# Patient Record
Sex: MALE | Race: OTHER
[De-identification: names, ages, dates, MRNs, and addresses within clinical notes are randomized per-mention and may not be internally consistent; named-entity substitution may affect disease eponyms.]

---

## 2018-03-15 ENCOUNTER — HOSPITAL ENCOUNTER (INPATIENT)
Dept: HOSPITAL 72 - EMR | Age: 68
LOS: 6 days | Discharge: HOME | DRG: 203 | End: 2018-03-21
Payer: COMMERCIAL

## 2018-03-15 VITALS — BODY MASS INDEX: 31.5 KG/M2 | HEIGHT: 66 IN | WEIGHT: 196 LBS

## 2018-03-15 VITALS — DIASTOLIC BLOOD PRESSURE: 56 MMHG | SYSTOLIC BLOOD PRESSURE: 141 MMHG

## 2018-03-15 VITALS — DIASTOLIC BLOOD PRESSURE: 73 MMHG | SYSTOLIC BLOOD PRESSURE: 160 MMHG

## 2018-03-15 VITALS — DIASTOLIC BLOOD PRESSURE: 106 MMHG | SYSTOLIC BLOOD PRESSURE: 159 MMHG

## 2018-03-15 VITALS — DIASTOLIC BLOOD PRESSURE: 80 MMHG | SYSTOLIC BLOOD PRESSURE: 160 MMHG

## 2018-03-15 DIAGNOSIS — Z95.1: ICD-10-CM

## 2018-03-15 DIAGNOSIS — I13.10: ICD-10-CM

## 2018-03-15 DIAGNOSIS — J45.901: Primary | ICD-10-CM

## 2018-03-15 DIAGNOSIS — R00.1: ICD-10-CM

## 2018-03-15 DIAGNOSIS — I10: ICD-10-CM

## 2018-03-15 DIAGNOSIS — I25.2: ICD-10-CM

## 2018-03-15 DIAGNOSIS — I12.9: ICD-10-CM

## 2018-03-15 DIAGNOSIS — E11.22: ICD-10-CM

## 2018-03-15 DIAGNOSIS — I25.5: ICD-10-CM

## 2018-03-15 DIAGNOSIS — I25.10: ICD-10-CM

## 2018-03-15 DIAGNOSIS — E78.5: ICD-10-CM

## 2018-03-15 DIAGNOSIS — Z87.891: ICD-10-CM

## 2018-03-15 DIAGNOSIS — E11.65: ICD-10-CM

## 2018-03-15 DIAGNOSIS — N18.3: ICD-10-CM

## 2018-03-15 LAB
ADD MANUAL DIFF: YES
ALBUMIN SERPL-MCNC: 3.3 G/DL (ref 3.4–5)
ALBUMIN/GLOB SERPL: 0.8 {RATIO} (ref 1–2.7)
ALP SERPL-CCNC: 100 U/L (ref 46–116)
ALT SERPL-CCNC: 35 U/L (ref 12–78)
ANION GAP SERPL CALC-SCNC: 3 MMOL/L (ref 5–15)
APPEARANCE UR: CLEAR
APTT PPP: (no result) S
AST SERPL-CCNC: 24 U/L (ref 15–37)
BILIRUB SERPL-MCNC: 0.3 MG/DL (ref 0.2–1)
BUN SERPL-MCNC: 17 MG/DL (ref 7–18)
CALCIUM SERPL-MCNC: 9.2 MG/DL (ref 8.5–10.1)
CHLORIDE SERPL-SCNC: 101 MMOL/L (ref 98–107)
CO2 SERPL-SCNC: 36 MMOL/L (ref 21–32)
CREAT SERPL-MCNC: 1.3 MG/DL (ref 0.55–1.3)
ERYTHROCYTE [DISTWIDTH] IN BLOOD BY AUTOMATED COUNT: 11.8 % (ref 11.6–14.8)
GLOBULIN SER-MCNC: 4.1 G/DL
GLUCOSE UR STRIP-MCNC: (no result) MG/DL
HCT VFR BLD CALC: 40.3 % (ref 42–52)
HGB BLD-MCNC: 14.2 G/DL (ref 14.2–18)
KETONES UR QL STRIP: NEGATIVE
LEUKOCYTE ESTERASE UR QL STRIP: NEGATIVE
MCV RBC AUTO: 94 FL (ref 80–99)
NITRITE UR QL STRIP: NEGATIVE
PH UR STRIP: 7 [PH] (ref 4.5–8)
PLATELET # BLD: 279 K/UL (ref 150–450)
POTASSIUM SERPL-SCNC: 4.8 MMOL/L (ref 3.5–5.1)
PROT UR QL STRIP: NEGATIVE
RBC # BLD AUTO: 4.27 M/UL (ref 4.7–6.1)
SODIUM SERPL-SCNC: 140 MMOL/L (ref 136–145)
SP GR UR STRIP: 1 (ref 1–1.03)
UROBILINOGEN UR-MCNC: NORMAL MG/DL (ref 0–1)
WBC # BLD AUTO: 8.7 K/UL (ref 4.8–10.8)

## 2018-03-15 PROCEDURE — 84443 ASSAY THYROID STIM HORMONE: CPT

## 2018-03-15 PROCEDURE — 80048 BASIC METABOLIC PNL TOTAL CA: CPT

## 2018-03-15 PROCEDURE — 84484 ASSAY OF TROPONIN QUANT: CPT

## 2018-03-15 PROCEDURE — 94664 DEMO&/EVAL PT USE INHALER: CPT

## 2018-03-15 PROCEDURE — 99291 CRITICAL CARE FIRST HOUR: CPT

## 2018-03-15 PROCEDURE — 83880 ASSAY OF NATRIURETIC PEPTIDE: CPT

## 2018-03-15 PROCEDURE — 78452 HT MUSCLE IMAGE SPECT MULT: CPT

## 2018-03-15 PROCEDURE — 85007 BL SMEAR W/DIFF WBC COUNT: CPT

## 2018-03-15 PROCEDURE — 83036 HEMOGLOBIN GLYCOSYLATED A1C: CPT

## 2018-03-15 PROCEDURE — 80053 COMPREHEN METABOLIC PANEL: CPT

## 2018-03-15 PROCEDURE — 81003 URINALYSIS AUTO W/O SCOPE: CPT

## 2018-03-15 PROCEDURE — 93005 ELECTROCARDIOGRAM TRACING: CPT

## 2018-03-15 PROCEDURE — 71045 X-RAY EXAM CHEST 1 VIEW: CPT

## 2018-03-15 PROCEDURE — 85025 COMPLETE CBC W/AUTO DIFF WBC: CPT

## 2018-03-15 PROCEDURE — 36415 COLL VENOUS BLD VENIPUNCTURE: CPT

## 2018-03-15 PROCEDURE — 93306 TTE W/DOPPLER COMPLETE: CPT

## 2018-03-15 PROCEDURE — 36600 WITHDRAWAL OF ARTERIAL BLOOD: CPT

## 2018-03-15 PROCEDURE — 94640 AIRWAY INHALATION TREATMENT: CPT

## 2018-03-15 PROCEDURE — 84153 ASSAY OF PSA TOTAL: CPT

## 2018-03-15 PROCEDURE — 94760 N-INVAS EAR/PLS OXIMETRY 1: CPT

## 2018-03-15 PROCEDURE — 90630: CPT

## 2018-03-15 PROCEDURE — 80061 LIPID PANEL: CPT

## 2018-03-15 PROCEDURE — 82962 GLUCOSE BLOOD TEST: CPT

## 2018-03-15 PROCEDURE — 82803 BLOOD GASES ANY COMBINATION: CPT

## 2018-03-15 PROCEDURE — 93017 CV STRESS TEST TRACING ONLY: CPT

## 2018-03-15 RX ADMIN — Medication SCH MCG: at 15:36

## 2018-03-15 RX ADMIN — ALBUTEROL SULFATE SCH MG: 2.5 SOLUTION RESPIRATORY (INHALATION) at 15:20

## 2018-03-15 RX ADMIN — ALBUTEROL SULFATE SCH MG: 2.5 SOLUTION RESPIRATORY (INHALATION) at 16:19

## 2018-03-15 RX ADMIN — ALBUTEROL SULFATE SCH MG: 2.5 SOLUTION RESPIRATORY (INHALATION) at 16:06

## 2018-03-15 RX ADMIN — Medication SCH MCG: at 15:03

## 2018-03-15 RX ADMIN — ALBUTEROL SULFATE SCH MG: 2.5 SOLUTION RESPIRATORY (INHALATION) at 16:29

## 2018-03-15 RX ADMIN — ALBUTEROL SULFATE SCH MG: 2.5 SOLUTION RESPIRATORY (INHALATION) at 15:36

## 2018-03-15 RX ADMIN — ALBUTEROL SULFATE SCH MG: 2.5 SOLUTION RESPIRATORY (INHALATION) at 15:02

## 2018-03-15 RX ADMIN — Medication SCH MCG: at 15:20

## 2018-03-15 NOTE — EMERGENCY ROOM REPORT
History of Present Illness


General


Chief Complaint:  Upper Respiratory Illness


Source:  Patient





Present Illness


HPI


67-year-old male with history of asthma, htn p/w SOB for 2 weeks


SOB occurs both at rest and on exertion. + non productive cough. Denies chest 

pain.


Patient has been using albuterol inhaler every 6 hours. No recent steroid use.


Pt states that this episode is similar to other episodes of asthma exacerbation.


Denies fever, chills. Denies sick contacts or recent travel.


States that he just came from his doctor's office who sent him to the ER


Allergies:  


Coded Allergies:  


     No Known Allergies (Unverified , 3/15/18)





Patient History


Past Medical History:  see triage record


Past Surgical History:  none


Pertinent Family History:  none


Reviewed Nursing Documentation:  PMH: Agreed, PSxH: Agreed





Nursing Documentation-PMH


Past Medical History:  No History, Except For


Hx Hypertension:  Yes


Hx Asthma:  Yes


Hx Diabetes:  Yes





Review of Systems


All Other Systems:  negative except mentioned in HPI





Physical Exam





Vital Signs








  Date Time  Temp Pulse Resp B/P (MAP) Pulse Ox O2 Delivery O2 Flow Rate FiO2


 


3/15/18 14:38 98.2 68 18 160/84 92 Room Air  





 98.2       








Sp02 EP Interpretation:  reviewed, normal


General Appearance:  alert, GCS 15, non-toxic, moderate distress


Head:  normocephalic, atraumatic


Eyes:  bilateral eye normal inspection, bilateral eye PERRL, bilateral eye EOMI


ENT:  normal ENT inspection, normal pharynx, normal voice, moist mucus membranes


Neck:  normal inspection, full range of motion, supple


Respiratory:  respiratory distress, wheezing, expiration


Cardiovascular #1:  normal inspection, regular rate, rhythm, no edema, normal 

capillary refill


Cardiovascular #2:  2+ radial (R), 2+ radial (L)


Gastrointestinal:  normal inspection, non tender, soft, non-distended, no 

guarding


Genitourinary:  no CVA tenderness


Musculoskeletal:  normal inspection, back normal, normal range of motion, non-

tender


Neurologic:  normal inspection, alert, oriented x3, responsive, motor strength/

tone normal, sensory intact, normal gait, speech normal


Psychiatric:  normal inspection, judgement/insight normal, memory normal


Skin:  normal inspection, normal color, no rash, warm/dry, well hydrated, 

normal turgor





Procedures


Critical Care Time


Critical Care Time


40 minutes of CC time


67-year-old male with asthma exacerbation


VS: Hypoxic, tachypneic





PLAN: IV access, labs, meds steroids and magnesium





Anticipate admission to [facility] vs. [facility]


CC time also includes review of labs, review of EMR, discussion with family and 

paperwork from SNF, d/w hospitalist


CC could include dosing of pressors, additional Abx


CC time does not include procedures





Medical Decision Making


Diagnostic Impression:  


 Primary Impression:  


 Asthma exacerbation


ER Course





67-year-old male with history of asthma p/w SOB


DDX: 


Asthma exacerbation, pneumonia, upper respiratory infection/viral syndrome





Plan: 


Combivent nebulizer treatment x 3, steroids, EKG, CXR


If patient's condition minimally improves/worsens will require IV access and 

blood work. Possible IV medications such as magnesium sulfate, continuous 

albuterol, BIPAP. 





ER Course:


Patient's respiratory status has been closely monitored in the ED.


Patient has been treated with combivent x 3, steroids, antibiotics.


IV mag sulfate


CXR reveals no acute infiltrate


Repeat lung auscultation reveals persistent wheezing. more nebs given, however 

pt does appear more comfortable








Disposition:


pt approved to be admitted to observation


DW Dr Guillen





Please note that this Emergency Department Report was dictated using [employer] technology software, occasionally this can lead to 

erroneous entry secondary to interpretation by the dictation equipment.





EKG Diagnostic Results 


EP Interpretation: Yes


Rate: normal


Rhythm: NSR


ST Segments: No acute changes, RBBB


ASA given to patient: No





Rhythm Strip


EP Interpretation: Yes


Rate: 66


Rhythm: NSR, no PVCs, no ectopy








Chest X-ray


CXR: Ordered: Yes


1 view


Indication: SOB


EP interpretation: Yes


Interpretation: No consolidation, no effusion, no PTX, no acute cardiopulmonary 

disease


Impression: No acute disease





Electronically signed by Clement Fraire MD





Laboratory Tests








Test


  3/15/18


14:25 3/15/18


15:00


 


Urine Color Pale yellow   


 


Urine Appearance Clear   


 


Urine pH 7 (4.5-8.0)   


 


Urine Specific Gravity


  1.005


(1.005-1.035) 


 


 


Urine Protein


  Negative


(NEGATIVE) 


 


 


Urine Glucose (UA)


  4+ (NEGATIVE)


H 


 


 


Urine Ketones


  Negative


(NEGATIVE) 


 


 


Urine Occult Blood


  Negative


(NEGATIVE) 


 


 


Urine Nitrite


  Negative


(NEGATIVE) 


 


 


Urine Bilirubin


  Negative


(NEGATIVE) 


 


 


Urine Urobilinogen


  Normal MG/DL


(0.0-1.0) 


 


 


Urine Leukocyte Esterase


  Negative


(NEGATIVE) 


 


 


White Blood Count


  


  8.7 K/UL


(4.8-10.8)


 


Red Blood Count


  


  4.27 M/UL


(4.70-6.10)  L


 


Hemoglobin


  


  14.2 G/DL


(14.2-18.0)


 


Hematocrit


  


  40.3 %


(42.0-52.0)  L


 


Mean Corpuscular Volume  94 FL (80-99)  


 


Mean Corpuscular Hemoglobin


  


  33.2 PG


(27.0-31.0)  H


 


Mean Corpuscular Hemoglobin


Concent 


  35.1 G/DL


(32.0-36.0)


 


Red Cell Distribution Width


  


  11.8 %


(11.6-14.8)


 


Platelet Count


  


  279 K/UL


(150-450)


 


Mean Platelet Volume


  


  5.7 FL


(6.5-10.1)  L


 


Neutrophils (%) (Auto)


  


  % (45.0-75.0)


 


 


Lymphocytes (%) (Auto)


  


  % (20.0-45.0)


 


 


Monocytes (%) (Auto)   % (1.0-10.0)  


 


Eosinophils (%) (Auto)   % (0.0-3.0)  


 


Basophils (%) (Auto)   % (0.0-2.0)  


 


Neutrophils % (Manual)  Pending  


 


Lymphocytes % (Manual)  Pending  


 


Platelet Estimate  Pending  


 


Platelet Morphology  Pending  


 


Sodium Level


  


  140 MMOL/L


(136-145)


 


Potassium Level


  


  4.8 MMOL/L


(3.5-5.1)


 


Chloride Level


  


  101 MMOL/L


()


 


Carbon Dioxide Level


  


  36 MMOL/L


(21-32)  H


 


Anion Gap


  


  3 mmol/L


(5-15)  L


 


Blood Urea Nitrogen


  


  17 mg/dL


(7-18)


 


Creatinine


  


  1.3 MG/DL


(0.55-1.30)


 


Estimate Glomerular


Filtration Rate 


  55.2 mL/min


(>60)


 


Glucose Level


  


  222 MG/DL


()  H


 


Calcium Level


  


  9.2 MG/DL


(8.5-10.1)


 


Total Bilirubin


  


  0.3 MG/DL


(0.2-1.0)


 


Aspartate Amino Transferase


(AST) 


  24 U/L (15-37)


 


 


Alanine Aminotransferase (ALT)


  


  35 U/L (12-78)


 


 


Alkaline Phosphatase


  


  100 U/L


()


 


Troponin I


  


  0.004 ng/mL


(0.000-0.056)


 


Pro-B-Type Natriuretic Peptide


  


  253 pg/mL


(0-125)  H


 


Total Protein


  


  7.4 G/DL


(6.4-8.2)


 


Albumin


  


  3.3 G/DL


(3.4-5.0)  L


 


Globulin  4.1 g/dL  


 


Albumin/Globulin Ratio


  


  0.8 (1.0-2.7)


L











Last Vital Signs








  Date Time  Temp Pulse Resp B/P (MAP) Pulse Ox O2 Delivery O2 Flow Rate FiO2


 


3/15/18 14:50 98.5 66 20 159/106 97 Room Air  





 98.5       








Disposition:  PLACE IN OBSERVATION


Condition:  Serious











RetinoClement M.D. Mar 15, 2018 14:53

## 2018-03-15 NOTE — DIAGNOSTIC IMAGING REPORT
Indication: Shortness of breath

 

Technique: One view of the chest

 

Comparison: none

 

Findings: The heart is upper limits of normal in size. Lungs and pleural spaces are

clear.

 

Impression: No acute process

## 2018-03-16 VITALS — DIASTOLIC BLOOD PRESSURE: 68 MMHG | SYSTOLIC BLOOD PRESSURE: 140 MMHG

## 2018-03-16 VITALS — SYSTOLIC BLOOD PRESSURE: 151 MMHG | DIASTOLIC BLOOD PRESSURE: 79 MMHG

## 2018-03-16 VITALS — SYSTOLIC BLOOD PRESSURE: 147 MMHG | DIASTOLIC BLOOD PRESSURE: 60 MMHG

## 2018-03-16 VITALS — SYSTOLIC BLOOD PRESSURE: 155 MMHG | DIASTOLIC BLOOD PRESSURE: 93 MMHG

## 2018-03-16 VITALS — DIASTOLIC BLOOD PRESSURE: 110 MMHG | SYSTOLIC BLOOD PRESSURE: 163 MMHG

## 2018-03-16 VITALS — DIASTOLIC BLOOD PRESSURE: 68 MMHG | SYSTOLIC BLOOD PRESSURE: 146 MMHG

## 2018-03-16 LAB
ADD MANUAL DIFF: YES
ANION GAP SERPL CALC-SCNC: 12 MMOL/L (ref 5–15)
BUN SERPL-MCNC: 22 MG/DL (ref 7–18)
CALCIUM SERPL-MCNC: 9.4 MG/DL (ref 8.5–10.1)
CHLORIDE SERPL-SCNC: 95 MMOL/L (ref 98–107)
CO2 SERPL-SCNC: 27 MMOL/L (ref 21–32)
CREAT SERPL-MCNC: 1.6 MG/DL (ref 0.55–1.3)
ERYTHROCYTE [DISTWIDTH] IN BLOOD BY AUTOMATED COUNT: 11.9 % (ref 11.6–14.8)
HCT VFR BLD CALC: 39.6 % (ref 42–52)
HGB BLD-MCNC: 13.6 G/DL (ref 14.2–18)
MCV RBC AUTO: 96 FL (ref 80–99)
PLATELET # BLD: 289 K/UL (ref 150–450)
POTASSIUM SERPL-SCNC: 4.8 MMOL/L (ref 3.5–5.1)
RBC # BLD AUTO: 4.14 M/UL (ref 4.7–6.1)
SODIUM SERPL-SCNC: 134 MMOL/L (ref 136–145)
WBC # BLD AUTO: 10.8 K/UL (ref 4.8–10.8)

## 2018-03-16 RX ADMIN — METHYLPREDNISOLONE SODIUM SUCCINATE SCH MG: 40 INJECTION, POWDER, LYOPHILIZED, FOR SOLUTION INTRAMUSCULAR; INTRAVENOUS at 17:56

## 2018-03-16 RX ADMIN — METHYLPREDNISOLONE SODIUM SUCCINATE SCH MG: 40 INJECTION, POWDER, LYOPHILIZED, FOR SOLUTION INTRAMUSCULAR; INTRAVENOUS at 14:14

## 2018-03-16 RX ADMIN — IPRATROPIUM BROMIDE AND ALBUTEROL SULFATE SCH ML: .5; 3 SOLUTION RESPIRATORY (INHALATION) at 23:02

## 2018-03-16 RX ADMIN — INSULIN ASPART SCH UNITS: 100 INJECTION, SOLUTION INTRAVENOUS; SUBCUTANEOUS at 21:38

## 2018-03-16 RX ADMIN — IPRATROPIUM BROMIDE AND ALBUTEROL SULFATE SCH ML: .5; 3 SOLUTION RESPIRATORY (INHALATION) at 11:31

## 2018-03-16 RX ADMIN — LOSARTAN POTASSIUM SCH MG: 50 TABLET, FILM COATED ORAL at 10:50

## 2018-03-16 RX ADMIN — METOPROLOL TARTRATE SCH MG: 50 TABLET, FILM COATED ORAL at 21:43

## 2018-03-16 RX ADMIN — METHYLPREDNISOLONE SODIUM SUCCINATE SCH MG: 40 INJECTION, POWDER, LYOPHILIZED, FOR SOLUTION INTRAMUSCULAR; INTRAVENOUS at 01:00

## 2018-03-16 RX ADMIN — IPRATROPIUM BROMIDE AND ALBUTEROL SULFATE SCH ML: .5; 3 SOLUTION RESPIRATORY (INHALATION) at 07:44

## 2018-03-16 RX ADMIN — FUROSEMIDE SCH MG: 40 TABLET ORAL at 10:51

## 2018-03-16 RX ADMIN — IPRATROPIUM BROMIDE AND ALBUTEROL SULFATE SCH ML: .5; 3 SOLUTION RESPIRATORY (INHALATION) at 03:24

## 2018-03-16 RX ADMIN — INSULIN DETEMIR SCH UNITS: 100 INJECTION, SOLUTION SUBCUTANEOUS at 11:00

## 2018-03-16 RX ADMIN — IPRATROPIUM BROMIDE AND ALBUTEROL SULFATE SCH ML: .5; 3 SOLUTION RESPIRATORY (INHALATION) at 19:27

## 2018-03-16 RX ADMIN — IPRATROPIUM BROMIDE AND ALBUTEROL SULFATE SCH ML: .5; 3 SOLUTION RESPIRATORY (INHALATION) at 14:54

## 2018-03-16 RX ADMIN — METOPROLOL TARTRATE SCH MG: 50 TABLET, FILM COATED ORAL at 10:51

## 2018-03-16 RX ADMIN — METHYLPREDNISOLONE SODIUM SUCCINATE SCH MG: 40 INJECTION, POWDER, LYOPHILIZED, FOR SOLUTION INTRAMUSCULAR; INTRAVENOUS at 06:24

## 2018-03-16 NOTE — HISTORY AND PHYSICAL REPORT
DATE OF ADMISSION:  03/15/2018



HISTORY OF PRESENT ILLNESS:  The patient is a 67-year-old man, who came to

the emergency department because of increasing shortness of breath,

wheezing, and cough due to asthma.  He states he has been having more

difficulty over the past two weeks.  He has asthma for about four years.

He quit smoking about 30 years ago, but was a heavy smoker prior to that.

The patient was admitted and started on breathing treatments and steroids

and is feeling better.  He is coughing up yellow sputum.  He has no high

fever.



PAST MEDICAL HISTORY:  The patient has longstanding asthma as noted above.

In addition, he has diabetes, hypertension, and hyperlipidemia.  He states

he has had two heart attacks in the past.  He has a history of edema and

difficulty urinating.



ALLERGIES:  None.



MEDICATIONS:  Reviewed.



REVIEW OF SYSTEMS:  He complains of arthritis.



PHYSICAL EXAMINATION:

GENERAL:  The patient is overweight.

VITAL SIGNS:  Show blood pressure is elevated.  There is no fever.

SKIN:  Warm and dry.

HEAD:  Head is normocephalic.

NECK:  No jugular vein distention.

CHEST:  Has wheezing.

CARDIAC:  Rhythm is regular.

ABDOMEN:  Obese, soft, and nontender.

EXTREMITIES:  Have 1+ edema.



LABORATORY AND DIAGNOSTIC DATA:  Laboratory studies are reviewed.  Chest

x-ray is clear.



IMPRESSION:

1. Exacerbation of asthma.

2. Diabetes, poor control.

3. Hypertension, poor control.

4. History of coronary disease.

5. Chronic kidney disease, stage 3 associated with diabetes.



PLAN:  The patient will be continued on nebulized bronchodilators and

steroids.  We will add antibiotics and blood pressure medication.



We will not give nonsteroidal medications due to kidney disease.  The

blood sugar will be monitored and insulin adjusted accordingly.









  ______________________________________________

  Ward Valencia M.D.





DR:  JOANNA

D:  03/16/2018 09:45

T:  03/16/2018 15:01

JOB#:  [id number]

CC:  Teto Guillen M.D.; Fax#:  [phone]

WARD VALENCIA M.D.  ; FAX#:  [phone]

## 2018-03-17 VITALS — SYSTOLIC BLOOD PRESSURE: 141 MMHG | DIASTOLIC BLOOD PRESSURE: 65 MMHG

## 2018-03-17 VITALS — DIASTOLIC BLOOD PRESSURE: 95 MMHG | SYSTOLIC BLOOD PRESSURE: 135 MMHG

## 2018-03-17 VITALS — SYSTOLIC BLOOD PRESSURE: 137 MMHG | DIASTOLIC BLOOD PRESSURE: 65 MMHG

## 2018-03-17 VITALS — DIASTOLIC BLOOD PRESSURE: 61 MMHG | SYSTOLIC BLOOD PRESSURE: 130 MMHG

## 2018-03-17 VITALS — DIASTOLIC BLOOD PRESSURE: 60 MMHG | SYSTOLIC BLOOD PRESSURE: 137 MMHG

## 2018-03-17 VITALS — SYSTOLIC BLOOD PRESSURE: 159 MMHG | DIASTOLIC BLOOD PRESSURE: 69 MMHG

## 2018-03-17 LAB
ADD MANUAL DIFF: YES
ALBUMIN SERPL-MCNC: 3.7 G/DL (ref 3.4–5)
ALBUMIN/GLOB SERPL: 0.9 {RATIO} (ref 1–2.7)
ALP SERPL-CCNC: 99 U/L (ref 46–116)
ALT SERPL-CCNC: 64 U/L (ref 12–78)
ANION GAP SERPL CALC-SCNC: 11 MMOL/L (ref 5–15)
AST SERPL-CCNC: 95 U/L (ref 15–37)
BILIRUB SERPL-MCNC: 0.3 MG/DL (ref 0.2–1)
BUN SERPL-MCNC: 38 MG/DL (ref 7–18)
CALCIUM SERPL-MCNC: 9 MG/DL (ref 8.5–10.1)
CHLORIDE SERPL-SCNC: 98 MMOL/L (ref 98–107)
CHOLEST SERPL-MCNC: 156 MG/DL (ref ?–200)
CO2 SERPL-SCNC: 31 MMOL/L (ref 21–32)
CREAT SERPL-MCNC: 1.7 MG/DL (ref 0.55–1.3)
ERYTHROCYTE [DISTWIDTH] IN BLOOD BY AUTOMATED COUNT: 11.9 % (ref 11.6–14.8)
GLOBULIN SER-MCNC: 4.2 G/DL
HCT VFR BLD CALC: 40.1 % (ref 42–52)
HDLC SERPL-MCNC: 41 MG/DL (ref 40–60)
HGB BLD-MCNC: 14 G/DL (ref 14.2–18)
MCV RBC AUTO: 94 FL (ref 80–99)
PLATELET # BLD: 292 K/UL (ref 150–450)
POTASSIUM SERPL-SCNC: 3.8 MMOL/L (ref 3.5–5.1)
RBC # BLD AUTO: 4.26 M/UL (ref 4.7–6.1)
SODIUM SERPL-SCNC: 139 MMOL/L (ref 136–145)
TRIGL SERPL-MCNC: 118 MG/DL (ref 30–150)
WBC # BLD AUTO: 18.3 K/UL (ref 4.8–10.8)

## 2018-03-17 RX ADMIN — IPRATROPIUM BROMIDE AND ALBUTEROL SULFATE SCH ML: .5; 3 SOLUTION RESPIRATORY (INHALATION) at 23:00

## 2018-03-17 RX ADMIN — METHYLPREDNISOLONE SODIUM SUCCINATE SCH MG: 40 INJECTION, POWDER, LYOPHILIZED, FOR SOLUTION INTRAMUSCULAR; INTRAVENOUS at 00:16

## 2018-03-17 RX ADMIN — INSULIN ASPART SCH UNITS: 100 INJECTION, SOLUTION INTRAVENOUS; SUBCUTANEOUS at 06:01

## 2018-03-17 RX ADMIN — LOSARTAN POTASSIUM SCH MG: 50 TABLET, FILM COATED ORAL at 08:31

## 2018-03-17 RX ADMIN — IPRATROPIUM BROMIDE AND ALBUTEROL SULFATE SCH ML: .5; 3 SOLUTION RESPIRATORY (INHALATION) at 07:01

## 2018-03-17 RX ADMIN — METOPROLOL TARTRATE SCH MG: 50 TABLET, FILM COATED ORAL at 08:31

## 2018-03-17 RX ADMIN — INSULIN DETEMIR SCH UNITS: 100 INJECTION, SOLUTION SUBCUTANEOUS at 08:43

## 2018-03-17 RX ADMIN — METOPROLOL TARTRATE SCH MG: 50 TABLET, FILM COATED ORAL at 20:49

## 2018-03-17 RX ADMIN — FUROSEMIDE SCH MG: 40 TABLET ORAL at 08:31

## 2018-03-17 RX ADMIN — IPRATROPIUM BROMIDE AND ALBUTEROL SULFATE SCH ML: .5; 3 SOLUTION RESPIRATORY (INHALATION) at 03:15

## 2018-03-17 RX ADMIN — IPRATROPIUM BROMIDE AND ALBUTEROL SULFATE SCH ML: .5; 3 SOLUTION RESPIRATORY (INHALATION) at 14:40

## 2018-03-17 RX ADMIN — INSULIN ASPART SCH UNITS: 100 INJECTION, SOLUTION INTRAVENOUS; SUBCUTANEOUS at 17:19

## 2018-03-17 RX ADMIN — IPRATROPIUM BROMIDE AND ALBUTEROL SULFATE SCH ML: .5; 3 SOLUTION RESPIRATORY (INHALATION) at 10:36

## 2018-03-17 RX ADMIN — INSULIN ASPART SCH UNITS: 100 INJECTION, SOLUTION INTRAVENOUS; SUBCUTANEOUS at 11:53

## 2018-03-17 RX ADMIN — METHYLPREDNISOLONE SODIUM SUCCINATE SCH MG: 40 INJECTION, POWDER, LYOPHILIZED, FOR SOLUTION INTRAMUSCULAR; INTRAVENOUS at 05:58

## 2018-03-17 RX ADMIN — IPRATROPIUM BROMIDE AND ALBUTEROL SULFATE SCH ML: .5; 3 SOLUTION RESPIRATORY (INHALATION) at 19:51

## 2018-03-17 RX ADMIN — INSULIN ASPART SCH UNITS: 100 INJECTION, SOLUTION INTRAVENOUS; SUBCUTANEOUS at 20:38

## 2018-03-17 NOTE — PULMONOLOGY PROGRESS NOTE
Assessment/Plan


Assessment/Plan


1. Exacerbation of asthma.


2. Diabetes, poor control.


3. Hypertension, poor control.


4. History of coronary disease.


5. Chronic kidney disease, stage 3 associated with diabetes.





PLAN:  The patient will be continued on nebulized bronchodilators and


steroids.  Continue antibiotics and blood pressure medication.


The


blood sugar will be monitored and insulin adjusted accordingly.





Subjective


Interval Events:  Feeling better; WBC 18K


Constitutional:  Reports: no symptoms


HEENT:  Repors: no symptoms


Respiratory:  Reports: no symptoms


Cardiovascular:  Reports: no symptoms


Genitourinary:  Reports: no symptoms


Allergies:  


Coded Allergies:  


     No Known Allergies (Unverified , 3/15/18)





Objective





Last 24 Hour Vital Signs








  Date Time  Temp Pulse Resp B/P (MAP) Pulse Ox O2 Delivery O2 Flow Rate FiO2


 


3/17/18 10:43  83 20  100 Room Air  21


 


3/17/18 10:37        21


 


3/17/18 10:36  81 20  98 Room Air  21


 


3/17/18 08:31    141/65    


 


3/17/18 08:31  85  141/65    


 


3/17/18 08:00 97.9 85 21 141/65 97 Room Air  





 97.9       


 


3/17/18 07:14  82 20  99 Room Air  21


 


3/17/18 07:03        21


 


3/17/18 07:01  84 20  98 Room Air  21


 


3/17/18 04:00  83      


 


3/17/18 04:00 97.9 83 18 135/95 93 Room Air  





 97.9       


 


3/17/18 03:30  74 20  95 Room Air  21


 


3/17/18 03:15        21


 


3/17/18 03:14  74 20  95 Room Air  21


 


3/17/18 00:00  78      


 


3/17/18 00:00 97.9 78 20 130/61 95 Room Air  





 97.9       


 


3/16/18 23:14  88 22  95 Room Air  21


 


3/16/18 23:01  88 22  95 Room Air  21


 


3/16/18 23:01        21


 


3/16/18 21:43  91  146/68    


 


3/16/18 20:00  107      


 


3/16/18 20:00 98.4 91 16 146/68 95 Room Air  





 98.4       


 


3/16/18 19:39  98 22  94 Room Air  21


 


3/16/18 19:27  98 22  94 Room Air  21


 


3/16/18 19:27        21


 


3/16/18 16:00  98      


 


3/16/18 16:00 98.0 99 22 151/79 94 Room Air  





 98.0       


 


3/16/18 15:01  99 18  99 Room Air  21


 


3/16/18 14:55        21


 


3/16/18 14:54  94 18  98 Room Air  21


 


3/16/18 12:00  97      


 


3/16/18 12:00 97.2 109 20 155/93 94 Room Air  





 97.2       


 


3/16/18 11:37  105 18  100 Room Air  21


 


3/16/18 11:32        21


 


3/16/18 11:31  103 18  99 Room Air  21

















Intake and Output  


 


 3/16/18 3/17/18





 19:00 07:00


 


Intake Total 960 ml 1000 ml


 


Balance 960 ml 1000 ml


 


  


 


Intake Oral 960 ml 1000 ml








HEENT:  normocephalic


Respiratory/Chest:  chest wall non-tender, normal breath sounds


Cardiovascular:  normal peripheral pulses


Abdomen:  normal bowel sounds


Laboratory Tests


3/17/18 06:10: 


White Blood Count 18.3#H, Red Blood Count 4.26L, Hemoglobin 14.0L, Hematocrit 

40.1L, Mean Corpuscular Volume 94, Mean Corpuscular Hemoglobin 32.8H, Mean 

Corpuscular Hemoglobin Concent 34.8, Red Cell Distribution Width 11.9, Platelet 

Count 292, Mean Platelet Volume 5.7L, Neutrophils (%) (Auto) , Lymphocytes (%) (

Auto) , Monocytes (%) (Auto) , Eosinophils (%) (Auto) , Basophils (%) (Auto) , 

Differential Total Cells Counted 100, Neutrophils % (Manual) 94H, Lymphocytes % 

(Manual) 2L, Monocytes % (Manual) 4, Eosinophils % (Manual) 0, Basophils % (

Manual) 0, Band Neutrophils 0, Platelet Estimate Adequate, Platelet Morphology 

Normal, Red Blood Cell Morphology Normal, Sodium Level 139, Potassium Level 3.8

, Chloride Level 98, Carbon Dioxide Level 31, Anion Gap 11, Blood Urea Nitrogen 

38H, Creatinine 1.7H, Estimat Glomerular Filtration Rate 40.5, Glucose Level 

339H, Hemoglobin A1c 9.0H, Calcium Level 9.0, Total Bilirubin 0.3, Aspartate 

Amino Transf (AST/SGOT) 95H, Alanine Aminotransferase (ALT/SGPT) 64, Alkaline 

Phosphatase 99, Total Protein 7.9, Albumin 3.7, Globulin 4.2, Albumin/Globulin 

Ratio 0.9L, Triglycerides Level 118, Cholesterol Level 156, LDL Cholesterol 104H

, HDL Cholesterol 41, Cholesterol/HDL Ratio 3.8, Prostate Specific Antigen 1.46

, Thyroid Stimulating Hormone (TSH) 0.131L





Current Medications








 Medications


  (Trade)  Dose


 Ordered  Sig/Teresa


 Route


 PRN Reason  Start Time


 Stop Time Status Last Admin


Dose Admin


 


 Albuterol/


 Ipratropium


  (Albuterol/


 Ipratropium)  3 ml  Q4HRT


 HHN


   3/16/18 03:00


 3/21/18 02:59  3/17/18 10:36


 


 


 Azithromycin


  (Zithromax)  250 mg  DAILY


 ORAL


   3/17/18 09:00


 3/24/18 08:59  3/17/18 08:31


 


 


 Dextrose


  (Dextrose 50%)    STAT  PRN


 IV


 Hypoglycemia  3/16/18 20:45


 4/15/18 20:44   


 


 


 Furosemide


  (Lasix)  40 mg  DAILY


 ORAL


   3/16/18 09:00


 4/15/18 08:59  3/17/18 08:31


 


 


 Gabapentin


  (Neurontin)  300 mg  TID


 ORAL


   3/16/18 09:00


 4/15/18 08:59  3/17/18 08:31


 


 


 Insulin Aspart


  (NovoLOG)    BEFORE MEALS AND  HS


 SUBQ


   3/16/18 21:00


 4/15/18 20:59  3/17/18 06:01


 


 


 Insulin Detemir


  (Levemir)  8 units  DAILY


 SUBQ


   3/16/18 09:00


 4/15/18 08:59  3/17/18 08:43


 


 


 Insulin Detemir


  (Levemir)  60 units  BEDTIME


 SUBQ


   3/17/18 21:00


 4/16/18 20:59   


 


 


 Losartan Potassium


  (Cozaar)  100 mg  DAILY


 ORAL


   3/16/18 10:00


 4/15/18 09:59  3/17/18 08:31


 


 


 Methylprednisolone


 Sodium Succinate


  (Solu-MEDROL)  40 mg  EVERY 6  HOURS


 IVP


   3/16/18 00:00


 4/15/18 00:00  3/17/18 05:58


 


 


 Metoprolol


 Tartrate


  (Lopressor)  50 mg  Q12HR


 ORAL


   3/16/18 09:00


 4/15/18 08:59  3/17/18 08:31


 


 


 Pantoprazole


  (Protonix)  40 mg  DAILY


 ORAL


   3/16/18 09:45


 4/15/18 09:44  3/17/18 08:31


 


 


 Potassium Chloride


  (K-Dur)  20 meq  DAILY


 ORAL


   3/16/18 09:00


 4/15/18 08:59  3/17/18 08:31


 

















Teto Guillen MD Mar 17, 2018 11:21

## 2018-03-18 VITALS — SYSTOLIC BLOOD PRESSURE: 135 MMHG | DIASTOLIC BLOOD PRESSURE: 60 MMHG

## 2018-03-18 VITALS — SYSTOLIC BLOOD PRESSURE: 126 MMHG | DIASTOLIC BLOOD PRESSURE: 63 MMHG

## 2018-03-18 VITALS — SYSTOLIC BLOOD PRESSURE: 179 MMHG | DIASTOLIC BLOOD PRESSURE: 146 MMHG

## 2018-03-18 VITALS — DIASTOLIC BLOOD PRESSURE: 71 MMHG | SYSTOLIC BLOOD PRESSURE: 148 MMHG

## 2018-03-18 VITALS — DIASTOLIC BLOOD PRESSURE: 58 MMHG | SYSTOLIC BLOOD PRESSURE: 136 MMHG

## 2018-03-18 VITALS — SYSTOLIC BLOOD PRESSURE: 148 MMHG | DIASTOLIC BLOOD PRESSURE: 129 MMHG

## 2018-03-18 VITALS — DIASTOLIC BLOOD PRESSURE: 66 MMHG | SYSTOLIC BLOOD PRESSURE: 134 MMHG

## 2018-03-18 VITALS — SYSTOLIC BLOOD PRESSURE: 164 MMHG | DIASTOLIC BLOOD PRESSURE: 91 MMHG

## 2018-03-18 VITALS — SYSTOLIC BLOOD PRESSURE: 142 MMHG | DIASTOLIC BLOOD PRESSURE: 63 MMHG

## 2018-03-18 VITALS — SYSTOLIC BLOOD PRESSURE: 150 MMHG | DIASTOLIC BLOOD PRESSURE: 73 MMHG

## 2018-03-18 VITALS — DIASTOLIC BLOOD PRESSURE: 68 MMHG | SYSTOLIC BLOOD PRESSURE: 133 MMHG

## 2018-03-18 VITALS — SYSTOLIC BLOOD PRESSURE: 160 MMHG | DIASTOLIC BLOOD PRESSURE: 71 MMHG

## 2018-03-18 LAB
ADD MANUAL DIFF: YES
ANION GAP SERPL CALC-SCNC: 4 MMOL/L (ref 5–15)
BUN SERPL-MCNC: 33 MG/DL (ref 7–18)
CALCIUM SERPL-MCNC: 8.4 MG/DL (ref 8.5–10.1)
CHLORIDE SERPL-SCNC: 102 MMOL/L (ref 98–107)
CO2 SERPL-SCNC: 34 MMOL/L (ref 21–32)
CREAT SERPL-MCNC: 1.2 MG/DL (ref 0.55–1.3)
ERYTHROCYTE [DISTWIDTH] IN BLOOD BY AUTOMATED COUNT: 11.7 % (ref 11.6–14.8)
HCT VFR BLD CALC: 39.1 % (ref 42–52)
HGB BLD-MCNC: 13.6 G/DL (ref 14.2–18)
MCV RBC AUTO: 94 FL (ref 80–99)
PLATELET # BLD: 270 K/UL (ref 150–450)
POTASSIUM SERPL-SCNC: 4.4 MMOL/L (ref 3.5–5.1)
RBC # BLD AUTO: 4.16 M/UL (ref 4.7–6.1)
SODIUM SERPL-SCNC: 140 MMOL/L (ref 136–145)
WBC # BLD AUTO: 17.1 K/UL (ref 4.8–10.8)

## 2018-03-18 RX ADMIN — INSULIN DETEMIR SCH UNITS: 100 INJECTION, SOLUTION SUBCUTANEOUS at 20:58

## 2018-03-18 RX ADMIN — METOPROLOL TARTRATE SCH MG: 50 TABLET, FILM COATED ORAL at 20:55

## 2018-03-18 RX ADMIN — INSULIN ASPART SCH UNITS: 100 INJECTION, SOLUTION INTRAVENOUS; SUBCUTANEOUS at 16:31

## 2018-03-18 RX ADMIN — INSULIN ASPART SCH UNITS: 100 INJECTION, SOLUTION INTRAVENOUS; SUBCUTANEOUS at 20:58

## 2018-03-18 RX ADMIN — ASPIRIN 81 MG SCH MG: 81 TABLET ORAL at 21:27

## 2018-03-18 RX ADMIN — INSULIN ASPART SCH UNITS: 100 INJECTION, SOLUTION INTRAVENOUS; SUBCUTANEOUS at 06:21

## 2018-03-18 RX ADMIN — METHYLPREDNISOLONE SODIUM SUCCINATE SCH MG: 40 INJECTION, POWDER, LYOPHILIZED, FOR SOLUTION INTRAMUSCULAR; INTRAVENOUS at 20:54

## 2018-03-18 RX ADMIN — IPRATROPIUM BROMIDE AND ALBUTEROL SULFATE SCH ML: .5; 3 SOLUTION RESPIRATORY (INHALATION) at 03:00

## 2018-03-18 NOTE — PULMONOLOGY PROGRESS NOTE
Assessment/Plan


Assessment/Plan


1. Exacerbation of asthma.


2. Diabetes, poor control.


3. Hypertension, poor control.


4. History of coronary disease.


5. Chronic kidney disease, stage 3 associated with diabetes.


6. tachycardia; now resolved.





PLAN:  The patient will be continued on nebulized bronchodilators and


steroids.  Continue antibiotics and blood pressure medication.


The


blood sugar will be monitored and insulin adjusted accordingly.


Transfer back to [facility] kisha lockhart


Will consult cardiology





Subjective


Interval Events:  Transferred to ICU last night diue to tachycardia


Constitutional:  Reports: no symptoms


HEENT:  Repors: no symptoms


Respiratory:  Reports: no symptoms


Cardiovascular:  Reports: no symptoms


Gastrointestinal/Abdominal:  Reports: no symptoms


Allergies:  


Coded Allergies:  


     No Known Allergies (Unverified , 3/15/18)





Objective





Last 24 Hour Vital Signs








  Date Time  Temp Pulse Resp B/P (MAP) Pulse Ox O2 Delivery O2 Flow Rate FiO2


 


3/18/18 11:00  59 18 160/71 99 Nasal Cannula 2.0 


 


3/18/18 10:00  67 18 148/129 99 Nasal Cannula 2.0 


 


3/18/18 09:05  85  142/63    


 


3/18/18 09:03    142/63    


 


3/18/18 09:00  86 18 179/146 99 Nasal Cannula 2.0 


 


3/18/18 08:00 98.2 83 18 142/63 99 Nasal Cannula 2.0 





 98.2       


 


3/18/18 07:05  66 18 136/58 95 Nasal Cannula 2.0 


 


3/18/18 06:09  70 18 134/66 97 Nasal Cannula 2.0 


 


3/18/18 05:00  85 20 133/68 98 Nasal Cannula 2.0 


 


3/18/18 04:42      Nasal Cannula 2.0 


 


3/18/18 04:42     100 Nasal Cannula 2.0 


 


3/18/18 04:15  80      


 


3/18/18 04:00 98.0 180 20 164/91 97 Nasal Cannula 2.0 





 98.0       


 


3/18/18 03:30      Room Air  


 


3/18/18 03:29      Room Air  


 


3/18/18 00:03 97.4 67 18 126/63 94 Room Air  





 97.4       


 


3/18/18 00:00  65      


 


3/17/18 23:37      Room Air  


 


3/17/18 23:37      Room Air  


 


3/17/18 20:49  85  159/69    


 


3/17/18 20:00 97.1 85 17 159/69 94 Room Air  





 97.1       


 


3/17/18 20:00  83      


 


3/17/18 20:00  82 20  100 Room Air  21


 


3/17/18 19:52  84 20  96 Room Air  21


 


3/17/18 19:52        21


 


3/17/18 16:00  81      


 


3/17/18 16:00 97.7 81 22 137/65 95 Room Air  





 97.7       


 


3/17/18 14:48  86 20  100 Room Air  21


 


3/17/18 14:41        21


 


3/17/18 14:40  79 20  99 Room Air  21


 


3/17/18 12:00  82      


 


3/17/18 12:00 97.9 78 22 137/60 97 Room Air  





 97.9       

















Intake and Output  


 


 3/17/18 3/18/18





 19:00 07:00


 


Intake Total 900 ml 240 ml


 


Output Total  700 ml


 


Balance 900 ml -460 ml


 


  


 


Intake Oral 900 ml 240 ml


 


Output Urine Total  700 ml


 


# Bowel Movements  1








General Appearance:  no acute distress


HEENT:  normocephalic


Respiratory/Chest:  chest wall non-tender, lungs clear


Cardiovascular:  normal peripheral pulses, normal rate


Abdomen:  normal bowel sounds


Laboratory Tests


3/18/18 04:22: 


Arterial Blood pH 7.454H, Arterial Blood Partial Pressure CO2 42.9, Arterial 

Blood Partial Pressure O2 96.7, Arterial Blood HCO3 29.4H, Arterial Blood 

Oxygen Saturation 97.4, Arterial Blood Base Excess 4.9, Carlos Test Positive


3/18/18 07:00: 


White Blood Count 17.1H, Red Blood Count 4.16L, Hemoglobin 13.6L, Hematocrit 

39.1L, Mean Corpuscular Volume 94, Mean Corpuscular Hemoglobin 32.8H, Mean 

Corpuscular Hemoglobin Concent 34.8, Red Cell Distribution Width 11.7, Platelet 

Count 270, Mean Platelet Volume 6.1L, Neutrophils (%) (Auto) , Lymphocytes (%) (

Auto) , Monocytes (%) (Auto) , Eosinophils (%) (Auto) , Basophils (%) (Auto) , 

Differential Total Cells Counted 100, Neutrophils % (Manual) 88H, Lymphocytes % 

(Manual) 7L, Monocytes % (Manual) 5, Eosinophils % (Manual) 0, Basophils % (

Manual) 0, Band Neutrophils 0, Platelet Estimate Adequate, Platelet Morphology 

Normal, Red Blood Cell Morphology Normal, Sodium Level 140, Potassium Level 4.4

, Chloride Level 102, Carbon Dioxide Level 34H, Anion Gap 4L, Blood Urea 

Nitrogen 33H, Creatinine 1.2, Estimat Glomerular Filtration Rate > 60, Glucose 

Level 266H, Calcium Level 8.4L, Troponin I 0.121H





Current Medications








 Medications


  (Trade)  Dose


 Ordered  Sig/Teresa


 Route


 PRN Reason  Start Time


 Stop Time Status Last Admin


Dose Admin


 


 Azithromycin


  (Zithromax)  250 mg  DAILY


 ORAL


   3/18/18 09:00


 3/24/18 08:59  3/18/18 09:23


 


 


 Dextrose


  (Dextrose 50%)    STAT  PRN


 IV


 Hypoglycemia  3/18/18 07:00


 4/17/18 06:59   


 


 


 Furosemide


  (Lasix)  40 mg  DAILY


 ORAL


   3/18/18 09:00


 4/15/18 08:59  3/18/18 09:04


 


 


 Gabapentin


  (Neurontin)  300 mg  TID


 ORAL


   3/18/18 09:00


 4/15/18 08:59  3/18/18 08:58


 


 


 Insulin Aspart


  (NovoLOG)    BEFORE MEALS AND  HS


 SUBQ


   3/18/18 11:30


 4/15/18 20:59   


 


 


 Insulin Detemir


  (Levemir)  8 units  DAILY


 SUBQ


   3/18/18 09:00


 4/15/18 08:59  3/18/18 08:48


 


 


 Insulin Detemir


  (Levemir)  60 units  BEDTIME


 SUBQ


   3/18/18 21:00


 4/16/18 20:59   


 


 


 Losartan Potassium


  (Cozaar)  100 mg  DAILY


 ORAL


   3/18/18 09:00


 4/15/18 09:59  3/18/18 09:03


 


 


 Methylprednisolone


 Sodium Succinate


  (Solu-MEDROL)  40 mg  EVERY 12  HOURS


 IVP


   3/18/18 09:00


 4/15/18 00:00  3/18/18 08:49


 


 


 Metoprolol


 Tartrate


  (Lopressor)  50 mg  Q12HR


 ORAL


   3/18/18 09:00


 4/15/18 08:59  3/18/18 09:05


 


 


 Pantoprazole


  (Protonix)  40 mg  DAILY


 ORAL


   3/18/18 09:00


 4/15/18 09:44  3/18/18 08:58


 


 


 Potassium Chloride


  (K-Dur)  20 meq  DAILY


 ORAL


   3/18/18 09:00


 4/15/18 08:59  3/18/18 08:57


 

















Teto Guillen MD Mar 18, 2018 11:40

## 2018-03-19 VITALS — DIASTOLIC BLOOD PRESSURE: 85 MMHG | SYSTOLIC BLOOD PRESSURE: 143 MMHG

## 2018-03-19 VITALS — SYSTOLIC BLOOD PRESSURE: 150 MMHG | DIASTOLIC BLOOD PRESSURE: 75 MMHG

## 2018-03-19 VITALS — SYSTOLIC BLOOD PRESSURE: 139 MMHG | DIASTOLIC BLOOD PRESSURE: 58 MMHG

## 2018-03-19 VITALS — DIASTOLIC BLOOD PRESSURE: 60 MMHG | SYSTOLIC BLOOD PRESSURE: 142 MMHG

## 2018-03-19 VITALS — SYSTOLIC BLOOD PRESSURE: 144 MMHG | DIASTOLIC BLOOD PRESSURE: 72 MMHG

## 2018-03-19 RX ADMIN — INSULIN ASPART SCH UNITS: 100 INJECTION, SOLUTION INTRAVENOUS; SUBCUTANEOUS at 20:41

## 2018-03-19 RX ADMIN — METHYLPREDNISOLONE SODIUM SUCCINATE SCH MG: 40 INJECTION, POWDER, LYOPHILIZED, FOR SOLUTION INTRAMUSCULAR; INTRAVENOUS at 20:36

## 2018-03-19 RX ADMIN — INSULIN DETEMIR SCH UNITS: 100 INJECTION, SOLUTION SUBCUTANEOUS at 20:38

## 2018-03-19 RX ADMIN — AZITHROMYCIN DIHYDRATE SCH MG: 250 TABLET, FILM COATED ORAL at 09:43

## 2018-03-19 RX ADMIN — LOSARTAN POTASSIUM SCH MG: 50 TABLET, FILM COATED ORAL at 09:42

## 2018-03-19 RX ADMIN — INSULIN ASPART SCH UNITS: 100 INJECTION, SOLUTION INTRAVENOUS; SUBCUTANEOUS at 11:47

## 2018-03-19 RX ADMIN — METHYLPREDNISOLONE SODIUM SUCCINATE SCH MG: 40 INJECTION, POWDER, LYOPHILIZED, FOR SOLUTION INTRAMUSCULAR; INTRAVENOUS at 09:49

## 2018-03-19 RX ADMIN — FUROSEMIDE SCH MG: 40 TABLET ORAL at 09:43

## 2018-03-19 RX ADMIN — METOPROLOL TARTRATE SCH MG: 50 TABLET, FILM COATED ORAL at 09:43

## 2018-03-19 RX ADMIN — INSULIN ASPART SCH UNITS: 100 INJECTION, SOLUTION INTRAVENOUS; SUBCUTANEOUS at 16:19

## 2018-03-19 RX ADMIN — METOPROLOL TARTRATE SCH MG: 50 TABLET, FILM COATED ORAL at 20:37

## 2018-03-19 RX ADMIN — INSULIN ASPART SCH UNITS: 100 INJECTION, SOLUTION INTRAVENOUS; SUBCUTANEOUS at 05:48

## 2018-03-19 RX ADMIN — INSULIN DETEMIR SCH UNITS: 100 INJECTION, SOLUTION SUBCUTANEOUS at 09:50

## 2018-03-19 RX ADMIN — ASPIRIN 81 MG SCH MG: 81 TABLET ORAL at 09:42

## 2018-03-19 NOTE — CONSULTATION
DATE OF CONSULTATION:  03/18/2018



CARDIOLOGY CONSULTATION



CONSULTING PHYSICIAN:  Jayson Parker M.D.



REQUESTING PHYSICIAN:  Teto Guillen M.D.



HISTORY OF PRESENT ILLNESS:  This is a 67-year-old male, who came to the

emergency room several days ago with shortness of breath, wheezing, and

cough.  He has a history of asthma.  He was treated with inhaled

bronchodilators and antimicrobials.  He was admitted to the intensive care

unit due to worsening respiratory parameters over the past few days as

well as uncontrolled blood pressure.  His respiratory parameters improved

today and he was noted to have a troponin level elevation of 0.112, which

was normal on admission.  He has not noted any chest pain.



PAST MEDICAL HISTORY:  Coronary artery disease, history of myocardial

infarctions x2, type 2 diabetes mellitus, hypertension, hyperlipidemia,

asthma, and prostatic hypertrophy.



MEDICATIONS:  Reviewed.



ALLERGIES:  None.



FAMILY HISTORY:  Noncontributory.



SOCIAL HISTORY:  No record of smoking or substance abuse.  No alcohol

abuse.



REVIEW OF SYSTEMS:  Otherwise unremarkable.



PHYSICAL EXAMINATION:

VITAL SIGNS:  Blood pressure ranging from 142/63 to 160/71.  There is an

extraneous level of 179/146 noted early this morning as well.  He has

heart rate in the 59 to 86 and respiratory 18 to 20.

HEENT:  Conjunctiva pink.  Oropharynx clear.

NECK:  Supple.  No accessory muscle use.

LUNGS:  With diminished breath sounds.  No wheezing.

CARDIAC:  Regular rhythm and rate.  Normal S1, S2 with a fourth heart

sound.

ABDOMEN:  Soft.

EXTREMITIES:  No edema.



DIAGNOSTIC DATA:  EKG revealed ectopic atrial rhythm, right bundle-branch

block, and nonspecific ST-T changes.



IMPRESSION:

1. Asthma exacerbation, improved.

2. Accelerated hypertension, improving.

3. Elevated troponin level in the setting of ischemic heart disease

suggestive of acute myocardial ischemia and possible non-ST-elevation

myocardial infarction.



PLAN:

1. Add aspirin.

2. Follow up troponin level and EKG.

3. Consider myocardial perfusion scan.

4. Maintain beta-blocker and titration of antihypertensive regimen for

optimal blood pressure control.









  ______________________________________________

  Jayson Parker M.D.





DR:  MARY

D:  03/18/2018 20:32

T:  03/19/2018 03:09

JOB#:  [id number]

CC:

## 2018-03-19 NOTE — PULMONOLOGY PROGRESS NOTE
Assessment/Plan


Assessment/Plan


1. Exacerbation of asthma.


2. Diabetes, poor control.


3. Hypertension, poor control.


4. History of coronary disease.


5. Chronic kidney disease, stage 3 associated with diabetes.


6. tachycardia; now resolved.





PLAN:  The patient will be continued on nebulized bronchodilators and


steroids.  Continue antibiotics and blood pressure medication.


The


blood sugar will be monitored and insulin adjusted accordingly.


Transfer back to [facility]


Has troponin leak; seen by cardiology





Stress test being planned.





Subjective


Interval Events:  FEELING BETTER


Constitutional:  Reports: no symptoms


HEENT:  Repors: no symptoms


Respiratory:  Reports: no symptoms


Cardiovascular:  Reports: no symptoms


Gastrointestinal/Abdominal:  Reports: no symptoms


Genitourinary:  Reports: no symptoms


Allergies:  


Coded Allergies:  


     No Known Allergies (Unverified , 3/15/18)





Objective





Last 24 Hour Vital Signs








  Date Time  Temp Pulse Resp B/P (MAP) Pulse Ox O2 Delivery O2 Flow Rate FiO2


 


3/19/18 04:00 97.2 56 20 143/85 100 Room Air  





 97.2       


 


3/19/18 03:58  57      


 


3/19/18 00:00  57      


 


3/18/18 20:55  79  149/76    


 


3/18/18 19:46  73      


 


3/18/18 15:58 97.5 71 18 150/73 99 Room Air  





 97.5       


 


3/18/18 15:31  72      


 


3/18/18 14:43  62 18 135/60 99 Room Air  


 


3/18/18 12:00  66 18 148/71 99 Nasal Cannula 2.0 


 


3/18/18 12:00  58      


 


3/18/18 11:00  59 18 160/71 99 Nasal Cannula 2.0 


 


3/18/18 10:00  67 18 148/129 99 Nasal Cannula 2.0 

















Intake and Output  


 


 3/18/18 3/19/18





 19:00 07:00


 


Intake Total 750 ml 400 ml


 


Output Total 1800 ml 400 ml


 


Balance -1050 ml 0 ml


 


  


 


Intake Oral 750 ml 400 ml


 


Output Urine Total 1800 ml 400 ml


 


# Voids 5 3








General Appearance:  no acute distress


HEENT:  normocephalic


Respiratory/Chest:  chest wall non-tender, lungs clear


Cardiovascular:  normal peripheral pulses, normal rate


Abdomen:  normal bowel sounds


Laboratory Tests


3/19/18 06:50: 


Troponin I 0.077H, Pro-B-Type Natriuretic Peptide 757H





Current Medications








 Medications


  (Trade)  Dose


 Ordered  Sig/Teresa


 Route


 PRN Reason  Start Time


 Stop Time Status Last Admin


Dose Admin


 


 Acetaminophen


  (Tylenol)  650 mg  Q6H  PRN


 ORAL


 Mild Pain/Temp > 100.5  3/19/18 03:15


 4/18/18 03:14  3/19/18 03:20


 


 


 Aspirin


  (ASA)  81 mg  DAILY


 ORAL


   3/18/18 20:30


 4/17/18 20:29  3/18/18 21:27


 


 


 Azithromycin


  (Zithromax)  250 mg  DAILY


 ORAL


   3/19/18 09:00


 3/24/18 08:59   


 


 


 Dextrose


  (Dextrose 50%)    STAT  PRN


 IV


 Hypoglycemia  3/18/18 13:45


 4/17/18 13:44   


 


 


 Fluticasone


 Propionate


  (Flonase)  2 spray  DAILYPRN  PRN


 NASAL


 allergic rhinitis  3/18/18 13:30


 4/17/18 13:29   


 


 


 Furosemide


  (Lasix)  40 mg  DAILY


 ORAL


   3/19/18 09:00


 4/15/18 08:59   


 


 


 Gabapentin


  (Neurontin)  300 mg  TID


 ORAL


   3/18/18 18:00


 4/15/18 08:59  3/18/18 16:31


 


 


 Insulin Aspart


  (NovoLOG)    BEFORE MEALS AND  HS


 SUBQ


   3/18/18 16:30


 4/15/18 20:59  3/19/18 05:48


 


 


 Insulin Detemir


  (Levemir)  8 units  DAILY


 SUBQ


   3/19/18 09:00


 4/15/18 08:59   


 


 


 Insulin Detemir


  (Levemir)  60 units  BEDTIME


 SUBQ


   3/18/18 21:00


 4/16/18 20:59  3/18/18 20:58


 


 


 Losartan Potassium


  (Cozaar)  100 mg  DAILY


 ORAL


   3/19/18 09:00


 4/15/18 09:59   


 


 


 Methylprednisolone


 Sodium Succinate


  (Solu-MEDROL)  40 mg  EVERY 12  HOURS


 IVP


   3/18/18 21:00


 4/15/18 00:00  3/18/18 20:54


 


 


 Metoprolol


 Tartrate


  (Lopressor)  50 mg  Q12HR


 ORAL


   3/18/18 21:00


 4/15/18 08:59  3/18/18 20:55


 


 


 Pantoprazole


  (Protonix)  40 mg  DAILY


 ORAL


   3/19/18 09:00


 4/15/18 09:44   


 


 


 Potassium Chloride


  (K-Dur)  20 meq  DAILY


 ORAL


   3/19/18 09:00


 4/15/18 08:59   


 

















Teto Guillen MD Mar 19, 2018 09:38

## 2018-03-20 VITALS — SYSTOLIC BLOOD PRESSURE: 138 MMHG | DIASTOLIC BLOOD PRESSURE: 73 MMHG

## 2018-03-20 VITALS — SYSTOLIC BLOOD PRESSURE: 143 MMHG | DIASTOLIC BLOOD PRESSURE: 86 MMHG

## 2018-03-20 VITALS — DIASTOLIC BLOOD PRESSURE: 59 MMHG | SYSTOLIC BLOOD PRESSURE: 132 MMHG

## 2018-03-20 VITALS — SYSTOLIC BLOOD PRESSURE: 149 MMHG | DIASTOLIC BLOOD PRESSURE: 81 MMHG

## 2018-03-20 VITALS — SYSTOLIC BLOOD PRESSURE: 123 MMHG | DIASTOLIC BLOOD PRESSURE: 55 MMHG

## 2018-03-20 VITALS — DIASTOLIC BLOOD PRESSURE: 86 MMHG | SYSTOLIC BLOOD PRESSURE: 141 MMHG

## 2018-03-20 RX ADMIN — INSULIN ASPART SCH UNITS: 100 INJECTION, SOLUTION INTRAVENOUS; SUBCUTANEOUS at 11:30

## 2018-03-20 RX ADMIN — METOPROLOL TARTRATE SCH MG: 50 TABLET, FILM COATED ORAL at 22:03

## 2018-03-20 RX ADMIN — LOSARTAN POTASSIUM SCH MG: 50 TABLET, FILM COATED ORAL at 08:59

## 2018-03-20 RX ADMIN — METHYLPREDNISOLONE SODIUM SUCCINATE SCH MG: 40 INJECTION, POWDER, LYOPHILIZED, FOR SOLUTION INTRAMUSCULAR; INTRAVENOUS at 09:00

## 2018-03-20 RX ADMIN — INSULIN DETEMIR SCH UNITS: 100 INJECTION, SOLUTION SUBCUTANEOUS at 22:06

## 2018-03-20 RX ADMIN — AZITHROMYCIN DIHYDRATE SCH MG: 250 TABLET, FILM COATED ORAL at 08:59

## 2018-03-20 RX ADMIN — ASPIRIN 81 MG SCH MG: 81 TABLET ORAL at 08:59

## 2018-03-20 RX ADMIN — INSULIN DETEMIR SCH UNITS: 100 INJECTION, SOLUTION SUBCUTANEOUS at 09:04

## 2018-03-20 RX ADMIN — FUROSEMIDE SCH MG: 40 TABLET ORAL at 08:58

## 2018-03-20 RX ADMIN — INSULIN ASPART SCH UNITS: 100 INJECTION, SOLUTION INTRAVENOUS; SUBCUTANEOUS at 17:04

## 2018-03-20 RX ADMIN — METOPROLOL TARTRATE SCH MG: 50 TABLET, FILM COATED ORAL at 08:58

## 2018-03-20 RX ADMIN — INSULIN ASPART SCH UNITS: 100 INJECTION, SOLUTION INTRAVENOUS; SUBCUTANEOUS at 22:05

## 2018-03-20 RX ADMIN — INSULIN ASPART SCH UNITS: 100 INJECTION, SOLUTION INTRAVENOUS; SUBCUTANEOUS at 06:39

## 2018-03-20 NOTE — DIAGNOSTIC IMAGING REPORT
Indications: Chest pain and elevated troponin

 

Technique: Single day single isotope protocol utilized. Initially, resting images

obtained using IV administration 9.6 millicuries 99M technetium Myoview.

Subsequently, patient underwent  Dobutamine stress testing. See cardiology report for

details. During dobutamine infusion, IV administration 30.1 mCi 99 M technetium

Myoview. SPECT and planar images obtained. SPECT images gated to 8 phases of the

cardiac cycle were also obtained, and reformatted into cine images for evaluation of

ejection fraction.

 

Comparison: none

 

Findings: Presence or absence of symptoms during infusion not reported in the

available cardiology report. Per cardiology report, resting EKG demonstrates normal

sinus rhythm. During the infusion, or EKG changes. Patient did experience a run of

atrial fibrillation in recovery.  Patient reached a peak heart rate of 141, in excess

of target heart rate 131 bpm imaging demonstrates equivocal decreased perfusion in

the inferior wall which is more likely diaphragmatic attenuation artifact. This is

similar on the resting images. No definite reversible perfusion defects are

demonstrated. Normal cardiac chamber size. Calculated post stress ejection fraction

64%. No focal wall motion abnormality

 

Impression: Nonischemic clinical response to pharmacologic stress, per cardiology

report

 

Nonischemic electrocardiographic response to pharmacologic stress, per cardiology

report

 

No imaging findings to suggest ischemia, at level of stress achieved.

 

Calculated post stress ejection fraction 64%

## 2018-03-20 NOTE — CARDIOLOGY REPORT
--------------- APPROVED REPORT --------------





EKG Measurement

Heart [id number]

WI 

[id number]

[id number]

[id number]

[id number]





Unusual P axis and short WI, probable junctional rhythm with undetermined rhythm 

irregularity

Right bundle branch block

Cannot rule out Inferior infarct, age undetermined

Abnormal ECG

## 2018-03-20 NOTE — PROGRESS NOTE
DATE:  03/19/2018



CARDIOLOGY PROGRESS NOTE



SUBJECTIVE:  No chest pain.  No shortness of breath.  Blood pressure

parameters now controlled.  Monitored rhythm, sinus and sinus

bradycardia.



OBJECTIVE:

LUNGS:  Good breath sounds.  No wheezing.

HEART:  Regular rhythm and rate.  Normal S1, S2 with a fourth heart

sound.

ABDOMEN:  Soft.

EXTREMITIES:  No edema.



LABORATORY DATA:  Troponin levels have decreased.



IMPRESSION:

1. Asthma exacerbation.

2. Acute myocardial infarction.

3. Ischemic cardiomyopathy.

4. Malignant hypertension, resolving.



PLAN:  Maintain current regimen.  Myocardial perfusion scan to follow.









  ______________________________________________

  Jayson Parker M.D.





DR:  [place]

D:  03/20/2018 01:41

T:  03/20/2018 02:22

JOB#:  [id number]

CC:

## 2018-03-20 NOTE — CARDIOLOGY REPORT
--------------- APPROVED REPORT --------------





EKG Measurement

Heart [id number]

MD 158P

[id number]

[id number]

[id number]





Normal sinus rhythm

Right bundle branch block

Abnormal ECG

## 2018-03-20 NOTE — PULMONOLOGY PROGRESS NOTE
Assessment/Plan


Assessment/Plan


1. Exacerbation of asthma.


2. Diabetes, poor control.


3. Hypertension, poor control.


4. History of coronary disease.


5. Chronic kidney disease, stage 3 associated with diabetes.


6. tachycardia; now resolved.





PLAN:  The patient will be continued on nebulized bronchodilators and


steroids.  Continue antibiotics and blood pressure medication.


The


blood sugar will be monitored and insulin adjusted accordingly.


Transfer back to [facility]


Has troponin leak; seen by cardiology





Stress test today


Will decrease stteroids.





Subjective


Interval Events:  For stress test today; WBC 17K


Constitutional:  Reports: no symptoms


HEENT:  Repors: no symptoms


Respiratory:  Reports: no symptoms


Gastrointestinal/Abdominal:  Reports: no symptoms


Genitourinary:  Reports: no symptoms


Allergies:  


Coded Allergies:  


     No Known Allergies (Unverified , 3/15/18)





Objective





Last 24 Hour Vital Signs








  Date Time  Temp Pulse Resp B/P (MAP) Pulse Ox O2 Delivery O2 Flow Rate FiO2


 


3/20/18 08:59    132/73    


 


3/20/18 08:58  62  138/70    


 


3/20/18 04:00  51      


 


3/20/18 04:00 97.6 61 20 132/59 97 Nasal Cannula 2.0 





 97.6       


 


3/20/18 00:00  48      


 


3/20/18 00:00 96.7 53 20 123/55 97 Nasal Cannula 2.0 





 96.7       


 


3/19/18 20:37  76  142/60    


 


3/19/18 20:00 97.6 76 22 142/60 97 Room Air  





 97.6       


 


3/19/18 20:00  61      


 


3/19/18 16:00  58      


 


3/19/18 16:00 97.7 61 19 139/58 98 Room Air  





 97.7       


 


3/19/18 12:00  57      


 


3/19/18 12:00 97.7 62 19 144/72 97 Room Air  





 97.7       

















Intake and Output  


 


 3/19/18 3/20/18





 19:00 07:00


 


Intake Total 830 ml 


 


Balance 830 ml 


 


  


 


Intake Oral 830 ml 


 


# Voids 3 4








General Appearance:  WD/WN


HEENT:  normocephalic


Respiratory/Chest:  chest wall non-tender, lungs clear


Cardiovascular:  normal peripheral pulses, normal rate


Abdomen:  normal bowel sounds


Laboratory Tests


3/20/18 07:42: Troponin I 0.047





Current Medications








 Medications


  (Trade)  Dose


 Ordered  Sig/Teresa


 Route


 PRN Reason  Start Time


 Stop Time Status Last Admin


Dose Admin


 


 Acetaminophen


  (Tylenol)  650 mg  Q6H  PRN


 ORAL


 Mild Pain/Temp > 100.5  3/19/18 03:15


 4/18/18 03:14  3/19/18 03:20


 


 


 Aspirin


  (ASA)  81 mg  DAILY


 ORAL


   3/18/18 20:30


 4/17/18 20:29  3/20/18 08:59


 


 


 Azithromycin


  (Zithromax)  250 mg  DAILY


 ORAL


   3/19/18 09:00


 3/24/18 08:59  3/20/18 08:59


 


 


 Dextrose


  (Dextrose 50%)    STAT  PRN


 IV


 Hypoglycemia  3/18/18 13:45


 4/17/18 13:44   


 


 


 Fluticasone


 Propionate


  (Flonase)  2 spray  DAILYPRN  PRN


 NASAL


 allergic rhinitis  3/18/18 13:30


 4/17/18 13:29   


 


 


 Furosemide


  (Lasix)  40 mg  DAILY


 ORAL


   3/19/18 09:00


 4/15/18 08:59  3/20/18 08:58


 


 


 Gabapentin


  (Neurontin)  300 mg  TID


 ORAL


   3/18/18 18:00


 4/15/18 08:59  3/20/18 08:59


 


 


 Insulin Aspart


  (NovoLOG)    BEFORE MEALS AND  HS


 SUBQ


   3/18/18 16:30


 4/15/18 20:59  3/20/18 06:39


 


 


 Insulin Detemir


  (Levemir)  8 units  DAILY


 SUBQ


   3/19/18 09:00


 4/15/18 08:59  3/20/18 09:04


 


 


 Insulin Detemir


  (Levemir)  60 units  BEDTIME


 SUBQ


   3/18/18 21:00


 4/16/18 20:59  3/19/18 20:38


 


 


 Losartan Potassium


  (Cozaar)  100 mg  DAILY


 ORAL


   3/19/18 09:00


 4/15/18 09:59  3/20/18 08:59


 


 


 Methylprednisolone


 Sodium Succinate


  (Solu-MEDROL)  40 mg  EVERY 12  HOURS


 IVP


   3/18/18 21:00


 4/15/18 00:00  3/20/18 09:00


 


 


 Metoprolol


 Tartrate


  (Lopressor)  50 mg  Q12HR


 ORAL


   3/18/18 21:00


 4/15/18 08:59  3/20/18 08:58


 


 


 Pantoprazole


  (Protonix)  40 mg  DAILY


 ORAL


   3/19/18 09:00


 4/15/18 09:44  3/20/18 08:59


 


 


 Potassium Chloride


  (K-Dur)  20 meq  DAILY


 ORAL


   3/19/18 09:00


 4/15/18 08:59  3/20/18 08:59


 

















Teto Guillen MD Mar 20, 2018 10:18

## 2018-03-21 VITALS — SYSTOLIC BLOOD PRESSURE: 120 MMHG | DIASTOLIC BLOOD PRESSURE: 65 MMHG

## 2018-03-21 VITALS — DIASTOLIC BLOOD PRESSURE: 86 MMHG | SYSTOLIC BLOOD PRESSURE: 145 MMHG

## 2018-03-21 VITALS — DIASTOLIC BLOOD PRESSURE: 80 MMHG | SYSTOLIC BLOOD PRESSURE: 145 MMHG

## 2018-03-21 VITALS — SYSTOLIC BLOOD PRESSURE: 123 MMHG | DIASTOLIC BLOOD PRESSURE: 73 MMHG

## 2018-03-21 RX ADMIN — FUROSEMIDE SCH MG: 40 TABLET ORAL at 08:35

## 2018-03-21 RX ADMIN — ASPIRIN 81 MG SCH MG: 81 TABLET ORAL at 08:34

## 2018-03-21 RX ADMIN — INSULIN ASPART SCH UNITS: 100 INJECTION, SOLUTION INTRAVENOUS; SUBCUTANEOUS at 06:30

## 2018-03-21 RX ADMIN — INSULIN ASPART SCH UNITS: 100 INJECTION, SOLUTION INTRAVENOUS; SUBCUTANEOUS at 12:02

## 2018-03-21 RX ADMIN — LOSARTAN POTASSIUM SCH MG: 50 TABLET, FILM COATED ORAL at 08:35

## 2018-03-21 RX ADMIN — AZITHROMYCIN DIHYDRATE SCH MG: 250 TABLET, FILM COATED ORAL at 08:35

## 2018-03-21 RX ADMIN — METOPROLOL TARTRATE SCH MG: 50 TABLET, FILM COATED ORAL at 08:35

## 2018-03-21 RX ADMIN — INSULIN DETEMIR SCH UNITS: 100 INJECTION, SOLUTION SUBCUTANEOUS at 09:43

## 2018-03-21 NOTE — CARDIOLOGY REPORT
--------------- APPROVED REPORT --------------





EXAM: Two-dimensional and M-mode echocardiogram with Doppler and color 

Doppler.



INDICATION

Acute myocardial infarction



M-Mode DIMENSIONS 

IVSd1.5 (0.7-1.1cm)Left Atrium (MM)4.0 (1.6-4.0cm)

LVDd4.1 (3.5-5.6cm)Aortic Root3.7 (2.0-3.7cm)

PWd1.8 (0.7-1.1cm)Aortic Cusp Exc.2.0 (1.5-2.0cm)



LVDs2.5 (2.5-4.0cm)

PWs2.3 cm





Normal left ventricular chamber size, systolic function and wall motion.

Left ventricular ejection fraction estimated to be 55 %.

Mild left ventricular hypertrophy.

Anterior Echo-free space, may be due to pericardial fat or effusion.

Left atrial size at upper limits of normal.

Right cardiac chamber sizes are within normal limits.

Focal aortic valve sclerosis with adequate cusp excursion.

Thickened mitral valve leaflets with normal excursion.

Mild mitral annulus and aortic root calcification.

Normal pulmonic valve structure. 

Normal tricuspid valve structure.

IVC dilated at 2.0 cm with physiological collapse.



A  color flow and spectral Doppler study was performed and revealed:

Trace aortic insufficiency.

Trace mitral regurgitation.



reduced left ventricular relaxation c/w impaired relaxation diastolic dysfunction.

Trace tricuspid regurgitation.

Tricuspid  systolic velocities suggests peak right ventricular systolic pressure of 25 

mmHg.

No pulmonic regurgitation present.

## 2018-03-21 NOTE — DISCHARGE SUMMARY
Discharge Summary


Hospital Course


Date of Admission


Mar 15, 2018 at 19:01


Date of Discharge


3/21/18


Admitting Diagnosis


ASTHMA EXACERBATION


HPI


Roe Sanz is a 67 year old male who was admitted on Mar 15, 2018 at 19:01 

for Asthma


Consultations


cardiology


Procedures


stress cardiac imaging - no ischemia


Hospital Course


improved with asthma treatment


stress study neg


dc home





1. Ischemic heart disease with troponin leak likely associated with


malignant hypertension.  No signs of reversible ischemia with dobutamine


stress.


2. History of CABG.


3. Sinus bradycardia secondary to medications and asymptomatic.


4. Malignant hypertension, now controlled.


5. Asthma exacerbation, resolving.


6. Diabetes mellitus with poor control.


7. Chronic kidney disease.





Discharge Medications


New Medications:  


Fluticasone Propionate (Fluticasone Propionate) 16 Gm [place].susp


2 SPRAY NASAL DAILYPRN PRN, #1 EA


per nostril


Furosemide* (Lasix*) 40 Mg Tablet


40 MG ORAL DAILY, #30 TAB





Insulin Detemir (Levemir Flexpen) 100 Unit/1 Ml Insuln.pen


8 UNITS SUBQ DAILY, #1 EA





Insulin Detemir (Levemir Flexpen) 100 Unit/1 Ml Insuln.pen


60 UNITS SUBQ BEDTIME, #1 EA





Losartan Potassium* (Cozaar*) 50 Mg Tablet


100 MG ORAL DAILY, #30 TAB





Pantoprazole* (Protonix*) 40 Mg Tablet.dr


40 MG ORAL DAILY, #14 TAB





Potassium Chloride* (K-Dur*) 20 Meq Tab.er.prt


20 MEQ ORAL DAILY, #30 TAB





Prednisone* (Prednisone*) 20 Mg Tablet


10 MG ORAL DAILY, #12 TAB





 


Continued Medications:  


Aspirin* (Aspir 81*) 81 Mg Tablet.dr


81 MG ORAL DAILY, TAB





Gabapentin* (Gabapentin*) 300 Mg Capsule


300 MG ORAL THREE TIMES A DAY, CAP 0 Refills





Insulin Aspart* (Novolog*) 100 Unit/1 Ml Insuln.pen


0 SUBQ, #1 EA 0 Refills





Metoprolol Tartrate* (Metoprolol Tartrate*) 50 Mg Tablet


50 MG ORAL EVERY 12 HOURS, TAB 0 Refills





Montelukast Sodium* (Montelukast Sodium*) 10 Mg Tablet


10 MG ORAL DAILY, TAB





Potassium Bicarbonate/Cit Ac (Potassium 25 Meq Tablet Eff) 25 Meq Tablet.eff


25 MEQ PO DAILY, TAB





Theophylline Anhydrous (Theophylline) 400 Mg Tablet.er


300 MG PO DAILY, TAB











Discharge


Condition Upon Discharge:  improving


Discharge Disposition


Patient was discharged to home


Discharge Diagnoses:  


(1) HTN (hypertension)


(2) CAD (coronary artery disease)


(3) Diabetes mellitus with renal manifestation











Ward Valencia MD Mar 21, 2018 09:43

## 2018-03-22 NOTE — PROGRESS NOTE
DATE:  03/21/2018



CARDIOLOGY PROGRESS NOTE



SUBJECTIVE:  The patient is without new distress.  Dobutamine myocardial

perfusion scan yesterday revealed no reversible ischemia with normal

ejection fraction with dobutamine stress.  The patient's monitored rhythm

remained sinus and sinus bradycardia on low-dose beta-blocker therapy.



SUBJECTIVE:

VITAL SIGNS:  Blood pressure 120/65, pulse 61, respirations 20, and

afebrile.

NECK:  Supple.

LUNGS:  Clear.

CARDIAC:  Regular rhythm and rate.  Normal S1, S2 with a fourth heart

sound.

ABDOMEN:  Soft.  No edema.  Median sternotomy scar.



IMPRESSION:

1. Ischemic heart disease with troponin leak due to malignant range

blood pressure that has now been controlled.  No signs of reversible

ischemia with dobutamine stress noted.

2. Prior history of CABG with adequate coronary flow reserve.

3. Stable cardiac rhythm with asymptomatic sinus bradycardia associated

with beta-blocker therapy.

4. Hypertension, now controlled following episodes of malignant

hypertension.

5. Chronic kidney disease, stable.

6. Diabetes mellitus with improving control.



PLAN:  Outpatient followup on current cardiovascular regimen including

anti-platelet and anti-lipid drugs long term.









  ______________________________________________

  Jayson Parker M.D. DR:  JANE

D:  03/21/2018 18:29

T:  03/22/2018 02:05

JOB#:  [id number]

CC: